# Patient Record
(demographics unavailable — no encounter records)

---

## 2025-07-28 NOTE — HISTORY OF PRESENT ILLNESS
[FreeTextEntry1] : 50F w/ PMH of chronic headache for CV eval for dizziness   EKG NSR no significant ST-T changes  ECHO normal EF, normal diastolic function, mild TR  Holter pending   Assessment and Plan 1. Headache   2. Dizziness  3. Mild TR   -Holter pending, for arrhythmia etiology.  -TTE ECHO unremarkable, reviewed w/ pt.  -pending neurology evaluation.   Iona Bro MD [facility] Interventional Cardiology Attending [facility] Tel: [phone] Mobile: [phone] - Angelito Go: [email or web address]